# Patient Record
Sex: MALE | Race: WHITE | NOT HISPANIC OR LATINO | URBAN - METROPOLITAN AREA
[De-identification: names, ages, dates, MRNs, and addresses within clinical notes are randomized per-mention and may not be internally consistent; named-entity substitution may affect disease eponyms.]

---

## 2024-06-19 DIAGNOSIS — I10 ESSENTIAL (PRIMARY) HYPERTENSION: ICD-10-CM

## 2024-06-19 RX ORDER — METOPROLOL SUCCINATE 50 MG/1
TABLET, EXTENDED RELEASE ORAL
Qty: 30 TABLET | Refills: 0 | Status: SHIPPED | OUTPATIENT
Start: 2024-06-19 | End: 2024-07-19

## 2024-06-19 RX ORDER — LOSARTAN POTASSIUM 50 MG/1
TABLET ORAL
Qty: 30 TABLET | Refills: 0 | Status: SHIPPED | OUTPATIENT
Start: 2024-06-19 | End: 2024-07-19

## 2024-07-16 DIAGNOSIS — I10 ESSENTIAL (PRIMARY) HYPERTENSION: ICD-10-CM

## 2024-07-16 RX ORDER — LOSARTAN POTASSIUM 50 MG/1
50 TABLET ORAL DAILY
Qty: 30 TABLET | Refills: 0 | OUTPATIENT
Start: 2024-07-16

## 2024-07-16 RX ORDER — METOPROLOL SUCCINATE 50 MG/1
50 TABLET, EXTENDED RELEASE ORAL DAILY
Qty: 30 TABLET | Refills: 0 | OUTPATIENT
Start: 2024-07-16

## 2024-07-21 DIAGNOSIS — K21.9 GASTRO-ESOPHAGEAL REFLUX DISEASE WITHOUT ESOPHAGITIS: ICD-10-CM

## 2024-07-21 RX ORDER — OMEPRAZOLE 40 MG/1
CAPSULE, DELAYED RELEASE ORAL
Qty: 90 CAPSULE | Refills: 3 | OUTPATIENT
Start: 2024-07-21

## 2024-08-15 ENCOUNTER — OFFICE VISIT (OUTPATIENT)
Dept: PRIMARY CARE | Facility: CLINIC | Age: 64
End: 2024-08-15
Payer: COMMERCIAL

## 2024-08-15 VITALS
WEIGHT: 212 LBS | HEART RATE: 72 BPM | HEIGHT: 67 IN | DIASTOLIC BLOOD PRESSURE: 80 MMHG | BODY MASS INDEX: 33.27 KG/M2 | OXYGEN SATURATION: 98 % | SYSTOLIC BLOOD PRESSURE: 122 MMHG

## 2024-08-15 DIAGNOSIS — E78.2 MIXED HYPERLIPIDEMIA: ICD-10-CM

## 2024-08-15 DIAGNOSIS — C77.0 METASTASIS TO HEAD AND NECK LYMPH NODE (MULTI): ICD-10-CM

## 2024-08-15 DIAGNOSIS — N40.0 BENIGN PROSTATIC HYPERPLASIA WITHOUT LOWER URINARY TRACT SYMPTOMS: ICD-10-CM

## 2024-08-15 DIAGNOSIS — R35.1 NOCTURIA: ICD-10-CM

## 2024-08-15 DIAGNOSIS — Z12.11 ENCOUNTER FOR SCREENING FOR MALIGNANT NEOPLASM OF COLON: ICD-10-CM

## 2024-08-15 DIAGNOSIS — Z00.00 ROUTINE GENERAL MEDICAL EXAMINATION AT A HEALTH CARE FACILITY: Primary | ICD-10-CM

## 2024-08-15 DIAGNOSIS — E03.9 ACQUIRED HYPOTHYROIDISM: ICD-10-CM

## 2024-08-15 DIAGNOSIS — R53.83 FATIGUE, UNSPECIFIED TYPE: ICD-10-CM

## 2024-08-15 DIAGNOSIS — I10 PRIMARY HYPERTENSION: ICD-10-CM

## 2024-08-15 DIAGNOSIS — E03.9 HYPOTHYROIDISM, UNSPECIFIED: ICD-10-CM

## 2024-08-15 DIAGNOSIS — K21.9 GASTROESOPHAGEAL REFLUX DISEASE WITHOUT ESOPHAGITIS: ICD-10-CM

## 2024-08-15 PROBLEM — C10.9 OROPHARYNGEAL CANCER (MULTI): Status: ACTIVE | Noted: 2024-08-15

## 2024-08-15 PROBLEM — E78.5 HYPERLIPIDEMIA: Status: ACTIVE | Noted: 2024-08-15

## 2024-08-15 PROCEDURE — 1036F TOBACCO NON-USER: CPT | Performed by: FAMILY MEDICINE

## 2024-08-15 PROCEDURE — 3079F DIAST BP 80-89 MM HG: CPT | Performed by: FAMILY MEDICINE

## 2024-08-15 PROCEDURE — 3008F BODY MASS INDEX DOCD: CPT | Performed by: FAMILY MEDICINE

## 2024-08-15 PROCEDURE — 99396 PREV VISIT EST AGE 40-64: CPT | Performed by: FAMILY MEDICINE

## 2024-08-15 PROCEDURE — 3074F SYST BP LT 130 MM HG: CPT | Performed by: FAMILY MEDICINE

## 2024-08-15 RX ORDER — LEVOTHYROXINE SODIUM 75 UG/1
75 TABLET ORAL EVERY 24 HOURS
COMMUNITY
Start: 2021-07-18 | End: 2024-08-17 | Stop reason: SDUPTHER

## 2024-08-15 RX ORDER — OMEPRAZOLE 40 MG/1
40 CAPSULE, DELAYED RELEASE ORAL
COMMUNITY

## 2024-08-15 RX ORDER — LEVOTHYROXINE SODIUM 75 UG/1
TABLET ORAL
Qty: 90 TABLET | Refills: 0 | Status: SHIPPED | OUTPATIENT
Start: 2024-08-15 | End: 2024-08-17 | Stop reason: WASHOUT

## 2024-08-15 ASSESSMENT — PATIENT HEALTH QUESTIONNAIRE - PHQ9
1. LITTLE INTEREST OR PLEASURE IN DOING THINGS: NOT AT ALL
2. FEELING DOWN, DEPRESSED OR HOPELESS: NOT AT ALL
SUM OF ALL RESPONSES TO PHQ9 QUESTIONS 1 AND 2: 0

## 2024-08-15 ASSESSMENT — ENCOUNTER SYMPTOMS
ARTHRALGIAS: 0
CHILLS: 0
OCCASIONAL FEELINGS OF UNSTEADINESS: 0
LOSS OF SENSATION IN FEET: 0
HEMATURIA: 0
FEVER: 0
SHORTNESS OF BREATH: 0
FREQUENCY: 0
WHEEZING: 0
WEAKNESS: 0
NAUSEA: 0
DEPRESSION: 0
DIZZINESS: 0
TREMORS: 0
VOMITING: 0
PALPITATIONS: 0
MYALGIAS: 0
FATIGUE: 1
CONSTIPATION: 0
COUGH: 0

## 2024-08-15 ASSESSMENT — PAIN SCALES - GENERAL: PAINLEVEL: 5

## 2024-08-15 NOTE — PROGRESS NOTES
Subjective   Patient ID: Randy Hardin is a 63 y.o. male who presents for Follow-up (medication).    Hypertension:          Patient here for F/U. at goal.          Med compliance yes.          Med side effects none.          Chest pain none.          Dizziness none.          Leg edema none.      Hypothyroidism:          Follow Up taking hormone supplement routinely, due for a tsh.          Hypothyroidism stable, tolerating meds with no side effects.      Hyperlipidemia:          Patient here for F/U. currently trying to control with therapeutic lifestyle changes.          Labs ordered today - he has had borderline chol, but has a neg CT calcium score.      GERD:          GERD F/U occasional rare use of heartburn meds - usses prn - stable currently.      Medications reviewed:          Current medications Use reviewed and recorded.          Vitamin/Mineral supplements Use reviewed and recorded.      Review family history:          Reviewed See family history.      Review past history:          Reviewed See past history.      Review surgical history:          Reviewed See surgical history.      Knee/Shin:          Knee pain, left, posteriorly, all around the joint - intermittent pain, not wanting to see anyone at this time.  Colonoscopy - due for a colonoscopy, gave Dr Bethanie krishnan         Review of Systems   Constitutional:  Positive for fatigue. Negative for chills and fever.   HENT:  Negative for ear pain, postnasal drip and tinnitus.    Respiratory:  Negative for cough, shortness of breath and wheezing.    Cardiovascular:  Negative for chest pain, palpitations and leg swelling.   Gastrointestinal:  Negative for constipation, nausea and vomiting.   Endocrine: Negative for cold intolerance.   Genitourinary:  Negative for frequency and hematuria.   Musculoskeletal:  Negative for arthralgias and myalgias.   Neurological:  Negative for dizziness, tremors and weakness.   Nocturia, BPH    Objective   /80   Pulse 72  "  Ht 1.702 m (5' 7\")   Wt 96.2 kg (212 lb)   SpO2 98%   BMI 33.20 kg/m²     Physical Exam  Constitutional:       General: He is not in acute distress.     Appearance: Normal appearance. He is not ill-appearing.   HENT:      Head: Normocephalic.      Right Ear: Tympanic membrane and external ear normal.      Left Ear: Tympanic membrane and external ear normal.      Nose: Nose normal. No congestion.      Mouth/Throat:      Mouth: Mucous membranes are moist.      Pharynx: No posterior oropharyngeal erythema.   Eyes:      Extraocular Movements: Extraocular movements intact.      Conjunctiva/sclera: Conjunctivae normal.      Pupils: Pupils are equal, round, and reactive to light.   Cardiovascular:      Rate and Rhythm: Normal rate and regular rhythm.      Pulses: Normal pulses.      Heart sounds: Normal heart sounds. No murmur heard.  Pulmonary:      Effort: Pulmonary effort is normal. No respiratory distress.      Breath sounds: Normal breath sounds. No wheezing.   Abdominal:      General: Bowel sounds are normal.      Palpations: Abdomen is soft. There is no mass.      Tenderness: There is no abdominal tenderness.      Hernia: No hernia is present.   Musculoskeletal:         General: Normal range of motion.      Cervical back: Neck supple. No tenderness.      Right lower leg: No edema.      Left lower leg: No edema.   Lymphadenopathy:      Cervical: No cervical adenopathy.   Skin:     General: Skin is warm.      Findings: No erythema or rash.   Neurological:      General: No focal deficit present.      Mental Status: He is alert and oriented to person, place, and time.      Motor: No weakness.      Gait: Gait normal.   Psychiatric:         Mood and Affect: Mood normal.         Behavior: Behavior normal.         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    BMI 33.0-33.9,adult Z68.33     Other Visit Diagnoses         Codes    Routine general medical examination at a health care facility    -  Primary Z00.00    Relevant Orders    Urinalysis with Reflex Culture and Microscopic    Comprehensive Metabolic Panel    CBC and Auto Differential    TSH with reflex to Free T4 if abnormal    Lipid Panel    Prostate Specific Antigen, Screen    Thyroxine, Free    Fatigue, unspecified type     R53.83    Relevant Orders    CBC and Auto Differential    Nocturia     R35.1    Relevant Orders    Urinalysis with Reflex Culture and Microscopic    Encounter for screening for malignant neoplasm of colon     Z12.11    colonoscopy due - card given for Dr Eaton

## 2024-08-16 ENCOUNTER — LAB (OUTPATIENT)
Dept: LAB | Facility: LAB | Age: 64
End: 2024-08-16
Payer: COMMERCIAL

## 2024-08-16 DIAGNOSIS — R35.1 NOCTURIA: ICD-10-CM

## 2024-08-16 DIAGNOSIS — Z00.00 ROUTINE GENERAL MEDICAL EXAMINATION AT A HEALTH CARE FACILITY: ICD-10-CM

## 2024-08-16 DIAGNOSIS — E03.9 ACQUIRED HYPOTHYROIDISM: ICD-10-CM

## 2024-08-16 DIAGNOSIS — N40.0 BENIGN PROSTATIC HYPERPLASIA WITHOUT LOWER URINARY TRACT SYMPTOMS: ICD-10-CM

## 2024-08-16 DIAGNOSIS — E78.2 MIXED HYPERLIPIDEMIA: ICD-10-CM

## 2024-08-16 DIAGNOSIS — R53.83 FATIGUE, UNSPECIFIED TYPE: ICD-10-CM

## 2024-08-16 LAB
ALBUMIN SERPL BCP-MCNC: 4.3 G/DL (ref 3.4–5)
ALP SERPL-CCNC: 52 U/L (ref 33–136)
ALT SERPL W P-5'-P-CCNC: 16 U/L (ref 10–52)
ANION GAP SERPL CALC-SCNC: 13 MMOL/L (ref 10–20)
APPEARANCE UR: CLEAR
AST SERPL W P-5'-P-CCNC: 14 U/L (ref 9–39)
BASOPHILS # BLD AUTO: 0.08 X10*3/UL (ref 0–0.1)
BASOPHILS NFR BLD AUTO: 1.3 %
BILIRUB SERPL-MCNC: 0.9 MG/DL (ref 0–1.2)
BILIRUB UR STRIP.AUTO-MCNC: NEGATIVE MG/DL
BUN SERPL-MCNC: 29 MG/DL (ref 6–23)
CALCIUM SERPL-MCNC: 9.5 MG/DL (ref 8.6–10.3)
CHLORIDE SERPL-SCNC: 106 MMOL/L (ref 98–107)
CHOLEST SERPL-MCNC: 203 MG/DL (ref 0–199)
CHOLESTEROL/HDL RATIO: 4
CO2 SERPL-SCNC: 26 MMOL/L (ref 21–32)
COLOR UR: YELLOW
CREAT SERPL-MCNC: 1.11 MG/DL (ref 0.5–1.3)
EGFRCR SERPLBLD CKD-EPI 2021: 75 ML/MIN/1.73M*2
EOSINOPHIL # BLD AUTO: 0.22 X10*3/UL (ref 0–0.7)
EOSINOPHIL NFR BLD AUTO: 3.6 %
ERYTHROCYTE [DISTWIDTH] IN BLOOD BY AUTOMATED COUNT: 14.6 % (ref 11.5–14.5)
GLUCOSE SERPL-MCNC: 100 MG/DL (ref 74–99)
GLUCOSE UR STRIP.AUTO-MCNC: NORMAL MG/DL
HCT VFR BLD AUTO: 42.8 % (ref 41–52)
HDLC SERPL-MCNC: 50.4 MG/DL
HGB BLD-MCNC: 13.9 G/DL (ref 13.5–17.5)
HOLD SPECIMEN: NORMAL
IMM GRANULOCYTES # BLD AUTO: 0.01 X10*3/UL (ref 0–0.7)
IMM GRANULOCYTES NFR BLD AUTO: 0.2 % (ref 0–0.9)
KETONES UR STRIP.AUTO-MCNC: NEGATIVE MG/DL
LDLC SERPL CALC-MCNC: 127 MG/DL
LEUKOCYTE ESTERASE UR QL STRIP.AUTO: NEGATIVE
LYMPHOCYTES # BLD AUTO: 1.44 X10*3/UL (ref 1.2–4.8)
LYMPHOCYTES NFR BLD AUTO: 23.3 %
MCH RBC QN AUTO: 29.6 PG (ref 26–34)
MCHC RBC AUTO-ENTMCNC: 32.5 G/DL (ref 32–36)
MCV RBC AUTO: 91 FL (ref 80–100)
MONOCYTES # BLD AUTO: 0.5 X10*3/UL (ref 0.1–1)
MONOCYTES NFR BLD AUTO: 8.1 %
NEUTROPHILS # BLD AUTO: 3.92 X10*3/UL (ref 1.2–7.7)
NEUTROPHILS NFR BLD AUTO: 63.5 %
NITRITE UR QL STRIP.AUTO: NEGATIVE
NON HDL CHOLESTEROL: 153 MG/DL (ref 0–149)
NRBC BLD-RTO: 0 /100 WBCS (ref 0–0)
PH UR STRIP.AUTO: 6 [PH]
PLATELET # BLD AUTO: 189 X10*3/UL (ref 150–450)
POTASSIUM SERPL-SCNC: 4.1 MMOL/L (ref 3.5–5.3)
PROT SERPL-MCNC: 6.6 G/DL (ref 6.4–8.2)
PROT UR STRIP.AUTO-MCNC: NEGATIVE MG/DL
PSA SERPL-MCNC: 1 NG/ML
RBC # BLD AUTO: 4.69 X10*6/UL (ref 4.5–5.9)
RBC # UR STRIP.AUTO: NEGATIVE /UL
SODIUM SERPL-SCNC: 141 MMOL/L (ref 136–145)
SP GR UR STRIP.AUTO: 1.03
T4 FREE SERPL-MCNC: 0.8 NG/DL (ref 0.61–1.12)
TRIGL SERPL-MCNC: 126 MG/DL (ref 0–149)
TSH SERPL-ACNC: 3.05 MIU/L (ref 0.44–3.98)
UROBILINOGEN UR STRIP.AUTO-MCNC: NORMAL MG/DL
VLDL: 25 MG/DL (ref 0–40)
WBC # BLD AUTO: 6.2 X10*3/UL (ref 4.4–11.3)

## 2024-08-16 PROCEDURE — 84153 ASSAY OF PSA TOTAL: CPT

## 2024-08-16 PROCEDURE — 36415 COLL VENOUS BLD VENIPUNCTURE: CPT

## 2024-08-17 DIAGNOSIS — E03.9 ACQUIRED HYPOTHYROIDISM: Primary | ICD-10-CM

## 2024-08-17 DIAGNOSIS — I10 ESSENTIAL (PRIMARY) HYPERTENSION: ICD-10-CM

## 2024-08-17 RX ORDER — LOSARTAN POTASSIUM 50 MG/1
50 TABLET ORAL DAILY
Qty: 90 TABLET | Refills: 3 | Status: SHIPPED | OUTPATIENT
Start: 2024-08-17 | End: 2025-08-12

## 2024-08-17 RX ORDER — METOPROLOL SUCCINATE 50 MG/1
50 TABLET, EXTENDED RELEASE ORAL DAILY
Qty: 90 TABLET | Refills: 3 | Status: SHIPPED | OUTPATIENT
Start: 2024-08-17 | End: 2025-08-17

## 2024-08-17 RX ORDER — LEVOTHYROXINE SODIUM 75 UG/1
75 TABLET ORAL EVERY 24 HOURS
Qty: 90 TABLET | Refills: 3 | Status: SHIPPED | OUTPATIENT
Start: 2024-08-17 | End: 2025-08-17

## 2024-08-19 ENCOUNTER — TELEPHONE (OUTPATIENT)
Dept: PRIMARY CARE | Facility: CLINIC | Age: 64
End: 2024-08-19
Payer: COMMERCIAL

## 2024-08-19 DIAGNOSIS — E78.2 MIXED HYPERLIPIDEMIA: ICD-10-CM

## 2024-08-19 RX ORDER — ATORVASTATIN CALCIUM 10 MG/1
10 TABLET, FILM COATED ORAL DAILY
Qty: 90 TABLET | Refills: 1 | Status: SHIPPED | OUTPATIENT
Start: 2024-08-19 | End: 2025-02-15

## 2024-08-19 NOTE — TELEPHONE ENCOUNTER
----- Message from Franco Nicole sent at 8/17/2024  2:30 PM EDT -----    ----- Message -----  From: Lab, Background User  Sent: 8/16/2024   2:35 PM EDT  To: Franco Nicole MD

## 2024-08-19 NOTE — TELEPHONE ENCOUNTER
Patient notified of recent lab results. He agrees to start Atorvastatin. Send to [facility] in [place]. Future lab orders placed.

## 2025-01-10 DIAGNOSIS — K21.9 GASTROESOPHAGEAL REFLUX DISEASE WITHOUT ESOPHAGITIS: Primary | ICD-10-CM

## 2025-01-10 RX ORDER — OMEPRAZOLE 40 MG/1
40 CAPSULE, DELAYED RELEASE ORAL
Qty: 90 CAPSULE | Refills: 2 | Status: SHIPPED | OUTPATIENT
Start: 2025-01-10 | End: 2026-01-10

## 2025-02-11 DIAGNOSIS — E78.2 MIXED HYPERLIPIDEMIA: ICD-10-CM

## 2025-02-11 RX ORDER — ATORVASTATIN CALCIUM 10 MG/1
10 TABLET, FILM COATED ORAL DAILY
Qty: 90 TABLET | Refills: 1 | Status: SHIPPED | OUTPATIENT
Start: 2025-02-11

## 2025-06-11 DIAGNOSIS — K21.9 GASTROESOPHAGEAL REFLUX DISEASE WITHOUT ESOPHAGITIS: ICD-10-CM

## 2025-06-11 RX ORDER — OMEPRAZOLE 40 MG/1
40 CAPSULE, DELAYED RELEASE ORAL
Qty: 90 CAPSULE | Refills: 0 | Status: SHIPPED | OUTPATIENT
Start: 2025-06-11 | End: 2025-09-09

## 2025-06-24 DIAGNOSIS — I10 ESSENTIAL (PRIMARY) HYPERTENSION: ICD-10-CM

## 2025-06-24 RX ORDER — METOPROLOL SUCCINATE 50 MG/1
50 TABLET, EXTENDED RELEASE ORAL DAILY
Qty: 90 TABLET | Refills: 0 | Status: SHIPPED | OUTPATIENT
Start: 2025-06-24 | End: 2025-09-22

## 2025-08-19 ENCOUNTER — OFFICE VISIT (OUTPATIENT)
Dept: PRIMARY CARE | Facility: CLINIC | Age: 65
End: 2025-08-19
Payer: COMMERCIAL

## 2025-08-19 VITALS
OXYGEN SATURATION: 97 % | DIASTOLIC BLOOD PRESSURE: 80 MMHG | BODY MASS INDEX: 33.27 KG/M2 | WEIGHT: 212 LBS | HEART RATE: 72 BPM | HEIGHT: 67 IN | SYSTOLIC BLOOD PRESSURE: 118 MMHG

## 2025-08-19 DIAGNOSIS — Z12.5 SCREENING FOR PROSTATE CANCER: ICD-10-CM

## 2025-08-19 DIAGNOSIS — Z00.00 ROUTINE GENERAL MEDICAL EXAMINATION AT A HEALTH CARE FACILITY: Primary | ICD-10-CM

## 2025-08-19 DIAGNOSIS — E03.9 ACQUIRED HYPOTHYROIDISM: ICD-10-CM

## 2025-08-19 DIAGNOSIS — E78.2 MIXED HYPERLIPIDEMIA: ICD-10-CM

## 2025-08-19 DIAGNOSIS — K21.9 GASTROESOPHAGEAL REFLUX DISEASE WITHOUT ESOPHAGITIS: ICD-10-CM

## 2025-08-19 DIAGNOSIS — R35.1 NOCTURIA: ICD-10-CM

## 2025-08-19 DIAGNOSIS — I10 ESSENTIAL (PRIMARY) HYPERTENSION: ICD-10-CM

## 2025-08-19 DIAGNOSIS — Z12.11 ENCOUNTER FOR SCREENING FOR MALIGNANT NEOPLASM OF COLON: ICD-10-CM

## 2025-08-19 DIAGNOSIS — Z13.6 SCREENING FOR HEART DISEASE: ICD-10-CM

## 2025-08-19 DIAGNOSIS — C77.0 METASTASIS TO HEAD AND NECK LYMPH NODE (MULTI): ICD-10-CM

## 2025-08-19 DIAGNOSIS — N40.0 BENIGN PROSTATIC HYPERPLASIA WITHOUT LOWER URINARY TRACT SYMPTOMS: ICD-10-CM

## 2025-08-19 DIAGNOSIS — E55.9 VITAMIN D DEFICIENCY: ICD-10-CM

## 2025-08-19 DIAGNOSIS — R53.83 FATIGUE, UNSPECIFIED TYPE: ICD-10-CM

## 2025-08-19 DIAGNOSIS — I10 PRIMARY HYPERTENSION: ICD-10-CM

## 2025-08-19 PROCEDURE — 3079F DIAST BP 80-89 MM HG: CPT | Performed by: FAMILY MEDICINE

## 2025-08-19 PROCEDURE — 3074F SYST BP LT 130 MM HG: CPT | Performed by: FAMILY MEDICINE

## 2025-08-19 PROCEDURE — 3008F BODY MASS INDEX DOCD: CPT | Performed by: FAMILY MEDICINE

## 2025-08-19 PROCEDURE — 1036F TOBACCO NON-USER: CPT | Performed by: FAMILY MEDICINE

## 2025-08-19 PROCEDURE — 99396 PREV VISIT EST AGE 40-64: CPT | Performed by: FAMILY MEDICINE

## 2025-08-19 RX ORDER — ATORVASTATIN CALCIUM 10 MG/1
10 TABLET, FILM COATED ORAL DAILY
Qty: 90 TABLET | Refills: 3 | Status: SHIPPED | OUTPATIENT
Start: 2025-08-19

## 2025-08-19 RX ORDER — LEVOTHYROXINE SODIUM 75 UG/1
75 TABLET ORAL EVERY 24 HOURS
Qty: 90 TABLET | Refills: 3 | Status: SHIPPED | OUTPATIENT
Start: 2025-08-19 | End: 2026-08-19

## 2025-08-19 RX ORDER — METOPROLOL SUCCINATE 50 MG/1
50 TABLET, EXTENDED RELEASE ORAL DAILY
Qty: 90 TABLET | Refills: 3 | Status: SHIPPED | OUTPATIENT
Start: 2025-08-19 | End: 2025-11-17

## 2025-08-19 RX ORDER — LOSARTAN POTASSIUM 50 MG/1
50 TABLET ORAL DAILY
Qty: 90 TABLET | Refills: 3 | Status: SHIPPED | OUTPATIENT
Start: 2025-08-19 | End: 2026-08-14

## 2025-08-19 RX ORDER — OMEPRAZOLE 40 MG/1
40 CAPSULE, DELAYED RELEASE ORAL
Qty: 90 CAPSULE | Refills: 3 | Status: SHIPPED | OUTPATIENT
Start: 2025-08-19 | End: 2025-11-17

## 2025-08-19 ASSESSMENT — ENCOUNTER SYMPTOMS
ARTHRALGIAS: 0
CONSTIPATION: 0
COUGH: 0
NAUSEA: 0
FATIGUE: 1
MYALGIAS: 0
CHILLS: 0
HEMATURIA: 0
TREMORS: 0
WHEEZING: 0
DIZZINESS: 0
VOMITING: 0
PALPITATIONS: 0
WEAKNESS: 0
SHORTNESS OF BREATH: 0
FEVER: 0
CONFUSION: 0
FREQUENCY: 0
NERVOUS/ANXIOUS: 0

## 2025-08-19 ASSESSMENT — PAIN SCALES - GENERAL: PAINLEVEL_OUTOF10: 0-NO PAIN

## 2025-08-20 ENCOUNTER — RESULTS FOLLOW-UP (OUTPATIENT)
Dept: PRIMARY CARE | Facility: CLINIC | Age: 65
End: 2025-08-20
Payer: COMMERCIAL

## 2025-08-20 ENCOUNTER — HOSPITAL ENCOUNTER (OUTPATIENT)
Dept: RADIOLOGY | Facility: HOSPITAL | Age: 65
Discharge: HOME | End: 2025-08-20
Payer: COMMERCIAL

## 2025-08-20 DIAGNOSIS — Z13.6 SCREENING FOR HEART DISEASE: ICD-10-CM

## 2025-08-20 LAB
25(OH)D3+25(OH)D2 SERPL-MCNC: 44 NG/ML (ref 30–100)
ALBUMIN SERPL-MCNC: 4.4 G/DL (ref 3.6–5.1)
ALP SERPL-CCNC: 51 U/L (ref 35–144)
ALT SERPL-CCNC: 23 U/L (ref 9–46)
ANION GAP SERPL CALCULATED.4IONS-SCNC: 9 MMOL/L (CALC) (ref 7–17)
APPEARANCE UR: CLEAR
AST SERPL-CCNC: 18 U/L (ref 10–35)
BACTERIA #/AREA URNS HPF: ABNORMAL /HPF
BASOPHILS # BLD AUTO: 58 CELLS/UL (ref 0–200)
BASOPHILS NFR BLD AUTO: 1.1 %
BILIRUB SERPL-MCNC: 1.1 MG/DL (ref 0.2–1.2)
BILIRUB UR QL STRIP: NEGATIVE
BUN SERPL-MCNC: 29 MG/DL (ref 7–25)
CALCIUM SERPL-MCNC: 9.1 MG/DL (ref 8.6–10.3)
CHLORIDE SERPL-SCNC: 110 MMOL/L (ref 98–110)
CHOLEST SERPL-MCNC: 124 MG/DL
CHOLEST/HDLC SERPL: 3.1 (CALC)
CO2 SERPL-SCNC: 24 MMOL/L (ref 20–32)
COLOR UR: YELLOW
CREAT SERPL-MCNC: 1.14 MG/DL (ref 0.7–1.35)
EGFRCR SERPLBLD CKD-EPI 2021: 72 ML/MIN/1.73M2
EOSINOPHIL # BLD AUTO: 122 CELLS/UL (ref 15–500)
EOSINOPHIL NFR BLD AUTO: 2.3 %
ERYTHROCYTE [DISTWIDTH] IN BLOOD BY AUTOMATED COUNT: 12.9 % (ref 11–15)
GLUCOSE SERPL-MCNC: 100 MG/DL (ref 65–139)
GLUCOSE UR QL STRIP: NEGATIVE
HCT VFR BLD AUTO: 44.2 % (ref 38.5–50)
HDLC SERPL-MCNC: 40 MG/DL
HGB BLD-MCNC: 14.1 G/DL (ref 13.2–17.1)
HGB UR QL STRIP: NEGATIVE
HYALINE CASTS #/AREA URNS LPF: ABNORMAL /LPF
KETONES UR QL STRIP: NEGATIVE
LDLC SERPL CALC-MCNC: 65 MG/DL (CALC)
LEUKOCYTE ESTERASE UR QL STRIP: ABNORMAL
LYMPHOCYTES # BLD AUTO: 1224 CELLS/UL (ref 850–3900)
LYMPHOCYTES NFR BLD AUTO: 23.1 %
MCH RBC QN AUTO: 29.3 PG (ref 27–33)
MCHC RBC AUTO-ENTMCNC: 31.9 G/DL (ref 32–36)
MCV RBC AUTO: 91.9 FL (ref 80–100)
MONOCYTES # BLD AUTO: 583 CELLS/UL (ref 200–950)
MONOCYTES NFR BLD AUTO: 11 %
NEUTROPHILS # BLD AUTO: 3313 CELLS/UL (ref 1500–7800)
NEUTROPHILS NFR BLD AUTO: 62.5 %
NITRITE UR QL STRIP: NEGATIVE
NONHDLC SERPL-MCNC: 84 MG/DL (CALC)
PH UR STRIP: 6 [PH] (ref 5–8)
PLATELET # BLD AUTO: 173 THOUSAND/UL (ref 140–400)
PMV BLD REES-ECKER: 11.2 FL (ref 7.5–12.5)
POTASSIUM SERPL-SCNC: 4.4 MMOL/L (ref 3.5–5.3)
PROT SERPL-MCNC: 6.5 G/DL (ref 6.1–8.1)
PROT UR QL STRIP: ABNORMAL
PSA SERPL-MCNC: 0.83 NG/ML
RBC # BLD AUTO: 4.81 MILLION/UL (ref 4.2–5.8)
RBC #/AREA URNS HPF: ABNORMAL /HPF
SERVICE CMNT-IMP: ABNORMAL
SODIUM SERPL-SCNC: 143 MMOL/L (ref 135–146)
SP GR UR STRIP: 1.03 (ref 1–1.03)
SQUAMOUS #/AREA URNS HPF: ABNORMAL /HPF
T4 FREE SERPL-MCNC: 1.4 NG/DL (ref 0.8–1.8)
TRIGL SERPL-MCNC: 107 MG/DL
TSH SERPL-ACNC: 2.26 MIU/L (ref 0.4–4.5)
WBC # BLD AUTO: 5.3 THOUSAND/UL (ref 3.8–10.8)
WBC #/AREA URNS HPF: ABNORMAL /HPF

## 2025-08-20 PROCEDURE — 75571 CT HRT W/O DYE W/CA TEST: CPT
